# Patient Record
Sex: MALE | Race: WHITE
[De-identification: names, ages, dates, MRNs, and addresses within clinical notes are randomized per-mention and may not be internally consistent; named-entity substitution may affect disease eponyms.]

---

## 2022-11-08 ENCOUNTER — HOSPITAL ENCOUNTER (OUTPATIENT)
Dept: HOSPITAL 75 - ORTHO | Age: 52
End: 2022-11-08
Attending: ORTHOPAEDIC SURGERY
Payer: COMMERCIAL
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
## 2022-11-08 DIAGNOSIS — S43.109A: Primary | ICD-10-CM

## 2022-11-08 PROCEDURE — 99203 OFFICE O/P NEW LOW 30 MIN: CPT

## 2022-11-08 PROCEDURE — 73030 X-RAY EXAM OF SHOULDER: CPT

## 2022-11-08 NOTE — DIAGNOSTIC IMAGING REPORT
INDICATION: Shoulder injury, pain



COMPARISON: 10/09/2018



TECHNIQUE: 3 radiographs of the right shoulder dated 11/08/2022.



FINDINGS: The distal clavicle is superiorly positioned in

relationship to the acromion by 1 shaft width with minimal

widening. No fracture of the clavicle. Fracturing of the

posterior right 2nd, 3rd, 4th, 5th, and 6th ribs is again noted

with some periosteal reaction now present. No new fracture. Mild

degenerative changes of the glenohumeral joint.



IMPRESSION: Multiple healing posterior right-sided rib fractures.



Age-indeterminate separation of the acromioclavicular joint with

the clavicle being superiorly positioned in relationship to the

acromion by 1 shaft width.



Mild degenerative changes.



Dictated by: 



  Dictated on workstation # [id number]

## 2022-11-29 ENCOUNTER — HOSPITAL ENCOUNTER (OUTPATIENT)
Dept: HOSPITAL 75 - ORTHO | Age: 52
End: 2022-11-29
Attending: ORTHOPAEDIC SURGERY
Payer: COMMERCIAL

## 2022-11-29 DIAGNOSIS — S43.121D: Primary | ICD-10-CM
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
## 2022-11-29 PROCEDURE — 99213 OFFICE O/P EST LOW 20 MIN: CPT
